# Patient Record
Sex: FEMALE | Race: OTHER | NOT HISPANIC OR LATINO
[De-identification: names, ages, dates, MRNs, and addresses within clinical notes are randomized per-mention and may not be internally consistent; named-entity substitution may affect disease eponyms.]

---

## 2017-08-10 ENCOUNTER — APPOINTMENT (OUTPATIENT)
Dept: SURGICAL ONCOLOGY | Facility: CLINIC | Age: 57
End: 2017-08-10
Payer: COMMERCIAL

## 2017-08-10 VITALS
DIASTOLIC BLOOD PRESSURE: 96 MMHG | WEIGHT: 250 LBS | HEIGHT: 63 IN | OXYGEN SATURATION: 99 % | HEART RATE: 95 BPM | RESPIRATION RATE: 16 BRPM | BODY MASS INDEX: 44.3 KG/M2 | SYSTOLIC BLOOD PRESSURE: 153 MMHG

## 2017-08-10 PROCEDURE — 99215 OFFICE O/P EST HI 40 MIN: CPT

## 2018-07-10 ENCOUNTER — OUTPATIENT (OUTPATIENT)
Dept: OUTPATIENT SERVICES | Facility: HOSPITAL | Age: 58
LOS: 1 days | Discharge: STILL ADMITTED OR ADMINISTRATIVE | End: 2018-07-10

## 2018-07-11 DIAGNOSIS — F33.1 MAJOR DEPRESSIVE DISORDER, RECURRENT, MODERATE: ICD-10-CM

## 2018-08-16 ENCOUNTER — APPOINTMENT (OUTPATIENT)
Dept: SURGICAL ONCOLOGY | Facility: CLINIC | Age: 58
End: 2018-08-16
Payer: COMMERCIAL

## 2018-08-16 VITALS
DIASTOLIC BLOOD PRESSURE: 112 MMHG | HEART RATE: 106 BPM | RESPIRATION RATE: 16 BRPM | BODY MASS INDEX: 45.18 KG/M2 | HEIGHT: 63 IN | WEIGHT: 255 LBS | OXYGEN SATURATION: 98 % | SYSTOLIC BLOOD PRESSURE: 158 MMHG

## 2018-08-16 VITALS — SYSTOLIC BLOOD PRESSURE: 150 MMHG | DIASTOLIC BLOOD PRESSURE: 90 MMHG

## 2018-08-16 PROCEDURE — 99214 OFFICE O/P EST MOD 30 MIN: CPT

## 2018-08-16 RX ORDER — ERGOCALCIFEROL (VITAMIN D2) 1250 MCG
50000 CAPSULE ORAL
Refills: 0 | Status: ACTIVE | COMMUNITY

## 2018-08-16 RX ORDER — LURASIDONE HYDROCHLORIDE 40 MG/1
40 TABLET, FILM COATED ORAL
Refills: 0 | Status: ACTIVE | COMMUNITY

## 2019-08-06 ENCOUNTER — APPOINTMENT (OUTPATIENT)
Dept: SURGICAL ONCOLOGY | Facility: CLINIC | Age: 59
End: 2019-08-06
Payer: COMMERCIAL

## 2019-08-06 VITALS
BODY MASS INDEX: 41.46 KG/M2 | RESPIRATION RATE: 16 BRPM | DIASTOLIC BLOOD PRESSURE: 99 MMHG | HEART RATE: 97 BPM | HEIGHT: 63 IN | WEIGHT: 234 LBS | SYSTOLIC BLOOD PRESSURE: 147 MMHG | TEMPERATURE: 98 F | OXYGEN SATURATION: 97 %

## 2019-08-06 PROCEDURE — 99214 OFFICE O/P EST MOD 30 MIN: CPT

## 2019-08-06 NOTE — ASSESSMENT
[FreeTextEntry1] : Imp:\par No evidence of new or recurrent lesions - hx of Right breast cancer. \par No suspicious lesions on exam.\par \par Plan:\par Continue yearly surveillance\par \par \par

## 2019-08-06 NOTE — PHYSICAL EXAM
[Normal Neck Lymph Nodes] : normal neck lymph nodes  [Normal Groin Lymph Nodes] : normal groin lymph nodes [Normal] : supple, no neck mass and thyroid not enlarged [Normal Supraclavicular Lymph Nodes] : normal supraclavicular lymph nodes [Normal Axillary Lymph Nodes] : normal axillary lymph nodes [Normal] : oriented to person, place and time, with appropriate affect [FreeTextEntry1] : Documented by Luca Young acting as a scribe for Dr. Moon 08/06/19\par  [de-identified] : Normal S1, S2. Regular rate and rhythm. [de-identified] : s/p bilateral mastectomy without signs of local recurrence.\par \par   [de-identified] : Clear breath sounds bilaterally, normal respiratory effort.

## 2019-08-06 NOTE — HISTORY OF PRESENT ILLNESS
[de-identified] : 59-year-old female presents for her yearly exam. \par \par Kari is status post bilateral mastectomy without reconstruction in 2013 for ductal carcinoma of the right breast. No further breast imaging since surgery. She followed with Dr. Petersen (med/onc) and was told she no longer needed to follow up.\par \par Denies chest wall discomfort, palpable chest wall nodules or skin changes. She has lost 30 pounds and is considering having the extra skin from chest wall removed.

## 2020-08-31 ENCOUNTER — APPOINTMENT (OUTPATIENT)
Dept: SURGICAL ONCOLOGY | Facility: CLINIC | Age: 60
End: 2020-08-31
Payer: COMMERCIAL

## 2020-08-31 VITALS
HEIGHT: 63 IN | BODY MASS INDEX: 41.46 KG/M2 | SYSTOLIC BLOOD PRESSURE: 136 MMHG | HEART RATE: 106 BPM | DIASTOLIC BLOOD PRESSURE: 80 MMHG | OXYGEN SATURATION: 98 % | WEIGHT: 234 LBS

## 2020-08-31 PROCEDURE — 99215 OFFICE O/P EST HI 40 MIN: CPT

## 2020-08-31 NOTE — HISTORY OF PRESENT ILLNESS
[de-identified] : 60-year-old female presents for her yearly exam. \par \par Kari is status post bilateral mastectomy without reconstruction in 2013 for ductal carcinoma of the right breast. No further breast imaging since surgery. She followed with Dr. Petersen (med/onc) and was told she no longer needed to follow up.\par \par Denies chest wall discomfort, palpable chest wall nodules or skin changes. She notes she lost some weight is still considering having the extra skin from chest wall removed.

## 2020-08-31 NOTE — ADDENDUM
[FreeTextEntry1] : I, Anderson Almazan, acted soley as a scribe for Dr. Miguel Moon on this date 08/31/2020.

## 2020-08-31 NOTE — PHYSICAL EXAM
[Normal] : supple, no neck mass and thyroid not enlarged [Normal Neck Lymph Nodes] : normal neck lymph nodes  [Normal Supraclavicular Lymph Nodes] : normal supraclavicular lymph nodes [Normal Groin Lymph Nodes] : normal groin lymph nodes [Normal Axillary Lymph Nodes] : normal axillary lymph nodes [Normal] : grossly intact [FreeTextEntry1] : I, Anderson Almazan was present for the physical exam.\par  [de-identified] : Normal S1, S2. Regular rate and rhythm. [de-identified] : s/p bilateral mastectomy without signs of local recurrence.\par \par   [de-identified] : Clear breath sounds bilaterally, normal respiratory effort.

## 2021-09-13 ENCOUNTER — APPOINTMENT (OUTPATIENT)
Dept: SURGICAL ONCOLOGY | Facility: CLINIC | Age: 61
End: 2021-09-13
Payer: COMMERCIAL

## 2021-09-13 VITALS
OXYGEN SATURATION: 97 % | HEIGHT: 63 IN | SYSTOLIC BLOOD PRESSURE: 141 MMHG | HEART RATE: 105 BPM | WEIGHT: 250 LBS | BODY MASS INDEX: 44.3 KG/M2 | DIASTOLIC BLOOD PRESSURE: 91 MMHG | RESPIRATION RATE: 17 BRPM

## 2021-09-13 PROCEDURE — 99213 OFFICE O/P EST LOW 20 MIN: CPT

## 2021-09-13 NOTE — PHYSICAL EXAM
[Normal] : supple, no neck mass and thyroid not enlarged [Normal Neck Lymph Nodes] : normal neck lymph nodes  [Normal Supraclavicular Lymph Nodes] : normal supraclavicular lymph nodes [Normal Axillary Lymph Nodes] : normal axillary lymph nodes [Normal] : oriented to person, place and time, with appropriate affect [FreeTextEntry1] : COVID-19 precautions as per [facility] policy was universally followed\par  [de-identified] : Normal S1, S2. regular rate and rhythm.  [de-identified] : Hx: b/l mastectomy Complete breast exam performed in supine and upright positions. No palpable masses, tenderness, nipple discharge, inversion, deviation or enlarged axillary or supraclavicular lymph nodes bilaterally.  [de-identified] : Clear breath sounds bilaterally, normal respiratory effort.

## 2021-09-13 NOTE — HISTORY OF PRESENT ILLNESS
[de-identified] : Kari Patton is a 61-year-old female presents for her yearly exam. \par \par Kari is status post bilateral mastectomy without reconstruction in 2013 for ductal carcinoma of the right breast. No further breast imaging since surgery. She followed with Dr. Petersen (med/onc) and was told she no longer needed to follow up.\par \par Denies chest wall discomfort, palpable chest wall nodules or skin changes. She notes she lost some weight is still considering having the extra skin from chest wall removed.

## 2022-09-27 ENCOUNTER — APPOINTMENT (OUTPATIENT)
Dept: SURGICAL ONCOLOGY | Facility: CLINIC | Age: 62
End: 2022-09-27
Payer: COMMERCIAL

## 2022-09-27 VITALS
HEIGHT: 63 IN | SYSTOLIC BLOOD PRESSURE: 158 MMHG | DIASTOLIC BLOOD PRESSURE: 93 MMHG | OXYGEN SATURATION: 96 % | TEMPERATURE: 96.8 F | BODY MASS INDEX: 45.89 KG/M2 | WEIGHT: 259 LBS | RESPIRATION RATE: 16 BRPM | HEART RATE: 99 BPM

## 2022-09-27 PROCEDURE — 99214 OFFICE O/P EST MOD 30 MIN: CPT

## 2022-09-27 NOTE — HISTORY OF PRESENT ILLNESS
[de-identified] : Kari Patton is a 62-year-old female presents for her yearly exam. \par \par Kari is status post bilateral mastectomy without reconstruction in 2013 for ductal carcinoma of the right breast. No further breast imaging since surgery. She followed with Dr. Petersen (med/onc) and was told she no longer needed to follow up.\par \par Denies chest wall discomfort, palpable chest wall nodules or skin changes. She notes she lost some weight is still considering having the extra skin from chest wall removed.

## 2022-09-27 NOTE — PHYSICAL EXAM
[Normal] : supple, no neck mass and thyroid not enlarged [Normal Neck Lymph Nodes] : normal neck lymph nodes  [Normal Supraclavicular Lymph Nodes] : normal supraclavicular lymph nodes [Normal Axillary Lymph Nodes] : normal axillary lymph nodes [Normal] : oriented to person, place and time, with appropriate affect [FreeTextEntry1] : SC present for exam  [de-identified] : Normal S1, S2. regular rate and rhythm.  [de-identified] : Hx: b/l mastectomy Complete breast exam performed in supine and upright positions. No palpable masses, tenderness, nipple discharge, inversion, deviation or enlarged axillary or supraclavicular lymph nodes bilaterally.  [de-identified] : Clear breath sounds bilaterally, normal respiratory effort.

## 2022-09-27 NOTE — ASSESSMENT
[FreeTextEntry1] : Imp:\par No evidence of new or recurrent lesions - hx of Right breast cancer. \par No suspicious lesions on exam.\par \par Plan:\par Continue yearly surveillance\par \par All medical entries were at my, Dr. Miguel Moon, direction. I have reviewed the chart and agree that the record accurately reflects my personal performance of the history, physical exam, assessment and plan. Our office Nurse Practitioner was present of the duration of the office visit. \par \par \par

## 2023-09-05 ENCOUNTER — NON-APPOINTMENT (OUTPATIENT)
Age: 63
End: 2023-09-05

## 2023-09-05 ENCOUNTER — APPOINTMENT (OUTPATIENT)
Dept: SURGICAL ONCOLOGY | Facility: CLINIC | Age: 63
End: 2023-09-05
Payer: MEDICAID

## 2023-09-05 VITALS
BODY MASS INDEX: 42.52 KG/M2 | RESPIRATION RATE: 16 BRPM | HEART RATE: 112 BPM | SYSTOLIC BLOOD PRESSURE: 140 MMHG | OXYGEN SATURATION: 97 % | WEIGHT: 240 LBS | HEIGHT: 63 IN | DIASTOLIC BLOOD PRESSURE: 91 MMHG

## 2023-09-05 DIAGNOSIS — D05.10 INTRADUCTAL CARCINOMA IN SITU OF UNSPECIFIED BREAST: ICD-10-CM

## 2023-09-05 DIAGNOSIS — Z85.3 ENCOUNTER FOR FOLLOW-UP EXAMINATION AFTER COMPLETED TREATMENT FOR MALIGNANT NEOPLASM: ICD-10-CM

## 2023-09-05 DIAGNOSIS — Z08 ENCOUNTER FOR FOLLOW-UP EXAMINATION AFTER COMPLETED TREATMENT FOR MALIGNANT NEOPLASM: ICD-10-CM

## 2023-09-05 PROCEDURE — 99213 OFFICE O/P EST LOW 20 MIN: CPT

## 2023-09-05 NOTE — HISTORY OF PRESENT ILLNESS
[de-identified] : Kari Patton is a 63-year-old female presents for her yearly exam.   Kari is status post bilateral mastectomy without reconstruction in 2013 for ductal carcinoma of the right breast. No further breast imaging since surgery. She followed with Dr. Petersen (med/onc) and was told she no longer needed to follow up.  Denies chest wall discomfort, palpable chest wall nodules or skin changes. She notes she lost some weight is still considering having the extra skin from chest wall removed.

## 2023-09-05 NOTE — ASSESSMENT
[FreeTextEntry1] : Imp: No evidence of new or recurrent lesions - hx of Right breast cancer.  No suspicious lesions on exam.  Plan: Continue yearly surveillance  All medical entries were at my, Dr. Miguel Moon, direction. I have reviewed the chart and agree that the record accurately reflects my personal performance of the history, physical exam, assessment and plan. Our office Nurse Practitioner was present of the duration of the office visit.

## 2023-09-05 NOTE — PHYSICAL EXAM
[FreeTextEntry1] : SC present for exam  [de-identified] : Normal S1, S2. regular rate and rhythm.  [de-identified] : b/l mastectomies [de-identified] : Clear breath sounds bilaterally, normal respiratory effort.

## 2024-09-03 NOTE — HISTORY OF PRESENT ILLNESS
[de-identified] : Kari Patton is a 64-year-old female presents for her yearly exam.   Kari is status post bilateral mastectomy without reconstruction in 2013 for ductal carcinoma of the right breast. No further breast imaging since surgery. She followed with Dr. Petersen (med/onc) and was told she no longer needed to follow up.  Denies chest wall discomfort, palpable chest wall nodules or skin changes. She notes she lost some weight is still considering having the extra skin from chest wall removed.

## 2024-09-03 NOTE — PHYSICAL EXAM
[FreeTextEntry1] : SC present for exam  [Normal] : supple, no neck mass and thyroid not enlarged [Normal Neck Lymph Nodes] : normal neck lymph nodes  [Normal Supraclavicular Lymph Nodes] : normal supraclavicular lymph nodes [Normal Axillary Lymph Nodes] : normal axillary lymph nodes [Normal] : oriented to person, place and time, with appropriate affect [de-identified] : Normal S1, S2. regular rate and rhythm.  [de-identified] : b/l mastectomies [de-identified] : Clear breath sounds bilaterally, normal respiratory effort.

## 2024-09-03 NOTE — CONSULT LETTER
[Sincerely,] : Sincerely, [FreeTextEntry3] : Miguel Moon M.D., FKRISTINA.Associate Professor of Surgery [employer] [facility] [employer] at [facility]

## 2024-09-09 ENCOUNTER — NON-APPOINTMENT (OUTPATIENT)
Age: 64
End: 2024-09-09

## 2024-09-10 ENCOUNTER — APPOINTMENT (OUTPATIENT)
Dept: SURGICAL ONCOLOGY | Facility: CLINIC | Age: 64
End: 2024-09-10
Payer: MEDICAID

## 2024-09-10 VITALS
SYSTOLIC BLOOD PRESSURE: 137 MMHG | HEIGHT: 63.5 IN | WEIGHT: 222 LBS | HEART RATE: 89 BPM | OXYGEN SATURATION: 98 % | DIASTOLIC BLOOD PRESSURE: 80 MMHG | BODY MASS INDEX: 38.85 KG/M2

## 2024-09-10 DIAGNOSIS — Z08 ENCOUNTER FOR FOLLOW-UP EXAMINATION AFTER COMPLETED TREATMENT FOR MALIGNANT NEOPLASM: ICD-10-CM

## 2024-09-10 DIAGNOSIS — D05.10 INTRADUCTAL CARCINOMA IN SITU OF UNSPECIFIED BREAST: ICD-10-CM

## 2024-09-10 DIAGNOSIS — Z85.3 ENCOUNTER FOR FOLLOW-UP EXAMINATION AFTER COMPLETED TREATMENT FOR MALIGNANT NEOPLASM: ICD-10-CM

## 2024-09-10 PROCEDURE — 99213 OFFICE O/P EST LOW 20 MIN: CPT

## 2024-09-10 NOTE — HISTORY OF PRESENT ILLNESS
[de-identified] : Kari Patton is a 64-year-old female presents for her yearly exam.   Kari is status post bilateral mastectomy without reconstruction in 2013 for ductal carcinoma of the right breast. No further breast imaging since surgery. She followed with Dr. Petersen (med/onc) and was told she no longer needed to follow up.  Denies chest wall discomfort, palpable chest wall nodules or skin changes. She notes she lost some weight is still considering having the extra skin from chest wall removed.

## 2024-09-10 NOTE — PHYSICAL EXAM
[FreeTextEntry1] : SC present for exam  [Normal] : normal lips, teeth and gums  [de-identified] : Normal S1, S2. regular rate and rhythm.  [de-identified] : b/l mastectomies w/o reconstruction, no recurrence  [de-identified] : Clear breath sounds bilaterally, normal respiratory effort.

## 2024-09-10 NOTE — HISTORY OF PRESENT ILLNESS
[de-identified] : Kari Patton is a 64-year-old female presents for her yearly exam.   Kari is status post bilateral mastectomy without reconstruction in 2013 for ductal carcinoma of the right breast. No further breast imaging since surgery. She followed with Dr. Petersen (med/onc) and was told she no longer needed to follow up.  Denies chest wall discomfort, palpable chest wall nodules or skin changes. She notes she lost some weight is still considering having the extra skin from chest wall removed.

## 2024-09-10 NOTE — CONSULT LETTER
[FreeTextEntry3] : Miguel Moon M.D., FKRISTINA.Associate Professor of Surgery [place] [employer] [facility] [employer] at [facility]

## 2024-09-10 NOTE — CONSULT LETTER
[FreeTextEntry3] : Miguel Moon M.D., FKRISTINA.Associate Professor of Surgery [place] [employer] [facility] [employer] at [facility]

## 2024-09-10 NOTE — PHYSICAL EXAM
[FreeTextEntry1] : SC present for exam  [Normal] : normal lips, teeth and gums  [de-identified] : Normal S1, S2. regular rate and rhythm.  [de-identified] : b/l mastectomies w/o reconstruction, no recurrence  [de-identified] : Clear breath sounds bilaterally, normal respiratory effort.

## 2025-01-01 ENCOUNTER — EMERGENCY (EMERGENCY)
Facility: HOSPITAL | Age: 65
LOS: 1 days | Discharge: HOME | End: 2025-01-01
Attending: EMERGENCY MEDICINE
Payer: SELF-PAY

## 2025-01-01 VITALS
WEIGHT: 220.02 LBS | RESPIRATION RATE: 17 BRPM | SYSTOLIC BLOOD PRESSURE: 148 MMHG | TEMPERATURE: 98 F | HEIGHT: 63 IN | HEART RATE: 94 BPM | OXYGEN SATURATION: 99 % | DIASTOLIC BLOOD PRESSURE: 86 MMHG

## 2025-01-01 LAB
ALBUMIN SERPL ELPH-MCNC: 4.1 G/DL — SIGNIFICANT CHANGE UP (ref 3.3–5)
ALP SERPL-CCNC: 101 U/L — SIGNIFICANT CHANGE UP (ref 40–120)
ALT FLD-CCNC: 14 U/L — SIGNIFICANT CHANGE UP (ref 10–45)
ANION GAP SERPL CALC-SCNC: 11 MMOL/L — SIGNIFICANT CHANGE UP (ref 5–17)
APTT BLD: 28.6 SEC — SIGNIFICANT CHANGE UP (ref 24.5–35.6)
AST SERPL-CCNC: 11 U/L — SIGNIFICANT CHANGE UP (ref 10–40)
BASOPHILS # BLD AUTO: 0.04 K/UL — SIGNIFICANT CHANGE UP (ref 0–0.2)
BASOPHILS NFR BLD AUTO: 0.4 % — SIGNIFICANT CHANGE UP (ref 0–2)
BILIRUB SERPL-MCNC: 0.2 MG/DL — SIGNIFICANT CHANGE UP (ref 0.2–1.2)
BUN SERPL-MCNC: 36 MG/DL — HIGH (ref 7–23)
CALCIUM SERPL-MCNC: 10.8 MG/DL — HIGH (ref 8.4–10.5)
CHLORIDE SERPL-SCNC: 110 MMOL/L — HIGH (ref 96–108)
CO2 SERPL-SCNC: 22 MMOL/L — SIGNIFICANT CHANGE UP (ref 22–31)
CREAT SERPL-MCNC: 1.59 MG/DL — HIGH (ref 0.5–1.3)
EGFR: 37 ML/MIN/1.73M2 — LOW
EGFR: 37 ML/MIN/1.73M2 — LOW
EOSINOPHIL # BLD AUTO: 0.33 K/UL — SIGNIFICANT CHANGE UP (ref 0–0.5)
EOSINOPHIL NFR BLD AUTO: 3.6 % — SIGNIFICANT CHANGE UP (ref 0–6)
GLUCOSE SERPL-MCNC: 132 MG/DL — HIGH (ref 70–99)
HCT VFR BLD CALC: 37.2 % — SIGNIFICANT CHANGE UP (ref 34.5–45)
HGB BLD-MCNC: 11.8 G/DL — SIGNIFICANT CHANGE UP (ref 11.5–15.5)
IMM GRANULOCYTES NFR BLD AUTO: 0.9 % — SIGNIFICANT CHANGE UP (ref 0–0.9)
INR BLD: 0.98 RATIO — SIGNIFICANT CHANGE UP (ref 0.85–1.16)
LYMPHOCYTES # BLD AUTO: 2.96 K/UL — SIGNIFICANT CHANGE UP (ref 1–3.3)
LYMPHOCYTES # BLD AUTO: 32.5 % — SIGNIFICANT CHANGE UP (ref 13–44)
MCHC RBC-ENTMCNC: 27.8 PG — SIGNIFICANT CHANGE UP (ref 27–34)
MCHC RBC-ENTMCNC: 31.7 G/DL — LOW (ref 32–36)
MCV RBC AUTO: 87.5 FL — SIGNIFICANT CHANGE UP (ref 80–100)
MONOCYTES # BLD AUTO: 0.64 K/UL — SIGNIFICANT CHANGE UP (ref 0–0.9)
MONOCYTES NFR BLD AUTO: 7 % — SIGNIFICANT CHANGE UP (ref 2–14)
NEUTROPHILS # BLD AUTO: 5.07 K/UL — SIGNIFICANT CHANGE UP (ref 1.8–7.4)
NEUTROPHILS NFR BLD AUTO: 55.6 % — SIGNIFICANT CHANGE UP (ref 43–77)
NRBC # BLD: 0 /100 WBCS — SIGNIFICANT CHANGE UP (ref 0–0)
NRBC BLD-RTO: 0 /100 WBCS — SIGNIFICANT CHANGE UP (ref 0–0)
PLATELET # BLD AUTO: 251 K/UL — SIGNIFICANT CHANGE UP (ref 150–400)
POTASSIUM SERPL-MCNC: 4.6 MMOL/L — SIGNIFICANT CHANGE UP (ref 3.5–5.3)
POTASSIUM SERPL-SCNC: 4.6 MMOL/L — SIGNIFICANT CHANGE UP (ref 3.5–5.3)
PROT SERPL-MCNC: 6.8 G/DL — SIGNIFICANT CHANGE UP (ref 6–8.3)
PROTHROM AB SERPL-ACNC: 11.3 SEC — SIGNIFICANT CHANGE UP (ref 9.9–13.4)
RBC # BLD: 4.25 M/UL — SIGNIFICANT CHANGE UP (ref 3.8–5.2)
RBC # FLD: 14.5 % — SIGNIFICANT CHANGE UP (ref 10.3–14.5)
SODIUM SERPL-SCNC: 143 MMOL/L — SIGNIFICANT CHANGE UP (ref 135–145)
TROPONIN T, HIGH SENSITIVITY RESULT: 18 NG/L — SIGNIFICANT CHANGE UP (ref 0–51)
TROPONIN T, HIGH SENSITIVITY RESULT: 18 NG/L — SIGNIFICANT CHANGE UP (ref 0–51)
WBC # BLD: 9.12 K/UL — SIGNIFICANT CHANGE UP (ref 3.8–10.5)
WBC # FLD AUTO: 9.12 K/UL — SIGNIFICANT CHANGE UP (ref 3.8–10.5)

## 2025-01-01 RX ORDER — CYCLOBENZAPRINE HYDROCHLORIDE 15 MG/1
10 CAPSULE, EXTENDED RELEASE ORAL ONCE
Refills: 0 | Status: COMPLETED | OUTPATIENT
Start: 2025-01-01 | End: 2025-01-01

## 2025-01-01 RX ORDER — KETOROLAC TROMETHAMINE 30 MG/ML
15 INJECTION, SOLUTION INTRAMUSCULAR; INTRAVENOUS ONCE
Refills: 0 | Status: DISCONTINUED | OUTPATIENT
Start: 2025-01-01 | End: 2025-01-01

## 2025-01-01 RX ORDER — LIDOCAINE HYDROCHLORIDE 20 MG/ML
1 JELLY TOPICAL ONCE
Refills: 0 | Status: COMPLETED | OUTPATIENT
Start: 2025-01-01 | End: 2025-01-01

## 2025-01-01 RX ORDER — ACETAMINOPHEN 500 MG/5ML
975 LIQUID (ML) ORAL ONCE
Refills: 0 | Status: COMPLETED | OUTPATIENT
Start: 2025-01-01 | End: 2025-01-01

## 2025-01-01 RX ADMIN — CYCLOBENZAPRINE HYDROCHLORIDE 10 MILLIGRAM(S): 15 CAPSULE, EXTENDED RELEASE ORAL at 21:00

## 2025-01-01 RX ADMIN — Medication 975 MILLIGRAM(S): at 21:00

## 2025-01-01 RX ADMIN — LIDOCAINE HYDROCHLORIDE 1 PATCH: 20 JELLY TOPICAL at 21:00

## 2025-01-01 RX ADMIN — KETOROLAC TROMETHAMINE 15 MILLIGRAM(S): 30 INJECTION, SOLUTION INTRAMUSCULAR; INTRAVENOUS at 20:59

## 2025-01-02 VITALS
HEART RATE: 88 BPM | OXYGEN SATURATION: 97 % | DIASTOLIC BLOOD PRESSURE: 83 MMHG | RESPIRATION RATE: 17 BRPM | TEMPERATURE: 98 F | SYSTOLIC BLOOD PRESSURE: 145 MMHG

## 2025-01-02 RX ORDER — CYCLOBENZAPRINE HYDROCHLORIDE 15 MG/1
1 CAPSULE, EXTENDED RELEASE ORAL
Qty: 9 | Refills: 0
Start: 2025-01-02 | End: 2025-01-04

## 2025-01-02 RX ORDER — LIDOCAINE HYDROCHLORIDE 20 MG/ML
1 JELLY TOPICAL
Qty: 1 | Refills: 0
Start: 2025-01-02

## 2025-06-13 ENCOUNTER — APPOINTMENT (OUTPATIENT)
Age: 65
End: 2025-06-13

## 2025-06-13 ENCOUNTER — NON-APPOINTMENT (OUTPATIENT)
Age: 65
End: 2025-06-13

## 2025-06-13 PROCEDURE — 92014 COMPRE OPH EXAM EST PT 1/>: CPT | Mod: 25

## 2025-06-13 PROCEDURE — 92134 CPTRZ OPH DX IMG PST SGM RTA: CPT

## 2025-06-13 PROCEDURE — 92202 OPSCPY EXTND ON/MAC DRAW: CPT

## 2025-09-16 ENCOUNTER — APPOINTMENT (OUTPATIENT)
Dept: SURGICAL ONCOLOGY | Facility: CLINIC | Age: 65
End: 2025-09-16

## 2025-09-16 DIAGNOSIS — D05.10 INTRADUCTAL CARCINOMA IN SITU OF UNSPECIFIED BREAST: ICD-10-CM
